# Patient Record
Sex: FEMALE | Race: WHITE | NOT HISPANIC OR LATINO | Employment: UNEMPLOYED | URBAN - METROPOLITAN AREA
[De-identification: names, ages, dates, MRNs, and addresses within clinical notes are randomized per-mention and may not be internally consistent; named-entity substitution may affect disease eponyms.]

---

## 2020-08-13 ENCOUNTER — TELEPHONE (OUTPATIENT)
Dept: ENDOCRINOLOGY | Age: 20
End: 2020-08-13

## 2020-08-19 ENCOUNTER — TELEPHONE (OUTPATIENT)
Dept: ENDOCRINOLOGY | Age: 20
End: 2020-08-19

## 2020-10-05 ENCOUNTER — OFFICE VISIT (OUTPATIENT)
Dept: ENDOCRINOLOGY | Age: 20
End: 2020-10-05

## 2020-10-05 VITALS
BODY MASS INDEX: 24.07 KG/M2 | SYSTOLIC BLOOD PRESSURE: 126 MMHG | DIASTOLIC BLOOD PRESSURE: 80 MMHG | HEART RATE: 93 BPM | OXYGEN SATURATION: 98 % | WEIGHT: 141 LBS | HEIGHT: 64 IN

## 2020-10-05 DIAGNOSIS — R53.82 CHRONIC FATIGUE: ICD-10-CM

## 2020-10-05 DIAGNOSIS — E03.9 ACQUIRED HYPOTHYROIDISM: Primary | ICD-10-CM

## 2020-10-05 PROCEDURE — 99204 OFFICE O/P NEW MOD 45 MIN: CPT | Performed by: INTERNAL MEDICINE

## 2020-10-05 RX ORDER — LEVOTHYROXINE SODIUM 0.05 MG/1
50 TABLET ORAL DAILY
Qty: 30 TABLET | Refills: 11 | Status: SHIPPED | OUTPATIENT
Start: 2020-10-05 | End: 2021-10-11

## 2020-10-05 RX ORDER — ETHINYL ESTRADIOL/DROSPIRENONE 0.02-3(28)
TABLET ORAL
COMMUNITY
Start: 2020-10-01 | End: 2022-05-15 | Stop reason: SDDI

## 2020-10-05 RX ORDER — FERROUS SULFATE 325(65) MG
325 TABLET ORAL
COMMUNITY
End: 2021-12-17

## 2020-10-05 NOTE — PROGRESS NOTES
Chief Complaint   Patient presents with   • Abnormal Lab     tsh   New patient appointment/ abnormal labs      HPI  Lucia Lloyd,20 y.o. WF is here as a new pt for the evaluation of thyroid abnormality.    Pt reports having symptoms for about 4 - 5 months.    She complains of feeling tired, weight has been stable, alternating c/o diarrhea and c/o constipation, no hair loss, sweating, no dry skin, sleep is ok. No c/o heat intolerance and c/o cold intolerance. No c/o tremors, does c/o racing of heart, c/o anxiety and no eye symptoms.   Denied c/o difficulty breathing, swallowing and change in voice.   Does have family hx of thyroid disease in grand mother.     Menarche at age 10, menstrual cycles regular or birth control pills. Never been pregnant      Reviewed primary care physician's/consulting physician documentation and lab results :     I have reviewed the patient's allergies, medicines, past medical hx, family hx and social hx in detail.    Past Medical History:   Diagnosis Date   • Syncope      Family hx -   CAD - mother side  DM - both sides of family.     Social History     Socioeconomic History   • Marital status: Single     Spouse name: Not on file   • Number of children: Not on file   • Years of education: Not on file   • Highest education level: Not on file   Tobacco Use   • Smoking status: Never Smoker   Substance and Sexual Activity   • Alcohol use: No       No Known Allergies      Current Outpatient Medications:   •  ferrous sulfate 325 (65 FE) MG tablet, Take 325 mg by mouth Daily With Breakfast., Disp: , Rfl:   •  Roslyn 3-0.02 MG per tablet, , Disp: , Rfl:   •  levothyroxine (SYNTHROID, LEVOTHROID) 50 MCG tablet, Take 1 tablet by mouth Daily., Disp: 30 tablet, Rfl: 11     Review of Systems   Constitutional: Positive for appetite change (decrease) and fatigue. Negative for fever.   Eyes: Negative for visual disturbance.   Respiratory: Negative for shortness of breath.    Cardiovascular: Positive for  "palpitations. Negative for leg swelling.   Gastrointestinal: Negative for abdominal pain and vomiting.   Endocrine: Negative for polydipsia and polyuria.   Musculoskeletal: Negative for joint swelling and neck pain.   Skin: Negative for rash.   Neurological: Positive for weakness. Negative for numbness.   Hematological: Bruises/bleeds easily.   Psychiatric/Behavioral: Negative for behavioral problems.       I have reviewed and confirmed the accuracy of the ROS as documented by the MA/LPN/RN Jonathon Arnlod MD        Objective:    /80   Pulse 93   Ht 162.6 cm (64\")   Wt 64 kg (141 lb)   SpO2 98%   BMI 24.20 kg/m²     Physical Exam  Vitals signs reviewed.   Constitutional:       Appearance: She is not diaphoretic.   HENT:      Head: Normocephalic and atraumatic.   Eyes:      General: No scleral icterus.     Conjunctiva/sclera: Conjunctivae normal.   Neck:      Musculoskeletal: Normal range of motion and neck supple.      Thyroid: No thyromegaly.   Cardiovascular:      Rate and Rhythm: Normal rate.      Heart sounds: Normal heart sounds. No murmur. No friction rub.   Pulmonary:      Effort: Pulmonary effort is normal.      Breath sounds: Normal breath sounds. No stridor. No wheezing or rales.   Abdominal:      General: Bowel sounds are normal. There is no distension.      Palpations: Abdomen is soft.      Tenderness: There is no abdominal tenderness.   Musculoskeletal:         General: No tenderness.   Lymphadenopathy:      Cervical: No cervical adenopathy.   Skin:     General: Skin is warm and dry.   Neurological:      Mental Status: She is alert and oriented to person, place, and time.         Results Review:    I reviewed the patient's new clinical results.    No results found for any previous visit.       Lucia was seen today for abnormal lab.    Diagnoses and all orders for this visit:    Acquired hypothyroidism  -     TSH; Future  -     T4, Free; Future  -     Thyroid Peroxidase Antibody; Future  -   " "  TSH; Future  -     T4, Free; Future  -     Basic Metabolic Panel; Future  -     Vitamin B12 & Folate; Future    Chronic fatigue  -     TSH; Future  -     T4, Free; Future  -     Thyroid Peroxidase Antibody; Future  -     TSH; Future  -     T4, Free; Future  -     Basic Metabolic Panel; Future  -     Vitamin B12 & Folate; Future    Other orders  -     levothyroxine (SYNTHROID, LEVOTHROID) 50 MCG tablet; Take 1 tablet by mouth Daily.      Hypothyroidism  Symptoms are worse  Start levothyroxine 50 mcg oral daily  Advised the patient to take the medication on empty stomach.    Chronic fatigue  Could be anemia related  Advised the patient to alert us if she develops any of the symptoms of racing of heart etc.    Thank you for asking me to see your patient Lucia Lloyd in consultation.        Jonathon Arnold MD  10/05/20    [employer] Dragon / transcription disclaimer:     \"Dictated utilizing Dragon dictation\".         "

## 2020-11-16 ENCOUNTER — RESULTS ENCOUNTER (OUTPATIENT)
Dept: ENDOCRINOLOGY | Age: 20
End: 2020-11-16

## 2020-11-16 DIAGNOSIS — E03.9 ACQUIRED HYPOTHYROIDISM: ICD-10-CM

## 2020-11-16 DIAGNOSIS — R53.82 CHRONIC FATIGUE: ICD-10-CM

## 2020-11-21 LAB
T4 FREE SERPL-MCNC: 1.41 NG/DL (ref 0.93–1.7)
THYROPEROXIDASE AB SERPL-ACNC: 83 IU/ML (ref 0–34)
TSH SERPL DL<=0.005 MIU/L-ACNC: 3.11 UIU/ML (ref 0.27–4.2)

## 2020-11-23 NOTE — PROGRESS NOTES
Mail results to pt, no treatment changes at this time.  Thyroid levels are stable on the current thyroid dosage.  No changes recommended at this time.

## 2021-10-11 RX ORDER — LEVOTHYROXINE SODIUM 0.05 MG/1
50 TABLET ORAL DAILY
Qty: 30 TABLET | Refills: 0 | Status: SHIPPED | OUTPATIENT
Start: 2021-10-11

## 2021-11-22 RX ORDER — LEVOTHYROXINE SODIUM 0.05 MG/1
TABLET ORAL
Qty: 30 TABLET | Refills: 0 | OUTPATIENT
Start: 2021-11-22